# Patient Record
Sex: FEMALE | Race: WHITE | Employment: OTHER | URBAN - METROPOLITAN AREA
[De-identification: names, ages, dates, MRNs, and addresses within clinical notes are randomized per-mention and may not be internally consistent; named-entity substitution may affect disease eponyms.]

---

## 2017-07-07 ENCOUNTER — HOSPITAL ENCOUNTER (OUTPATIENT)
Dept: LAB | Age: 82
Discharge: HOME | End: 2017-07-07

## 2017-07-07 LAB
BASOPHILS # BLD AUTO: 0 K/UL (ref 0–0.2)
BASOPHILS # BLD: 0 % (ref 0–2)
DIFFERENTIAL METHOD BLD: ABNORMAL
EOSINOPHIL # BLD: 0 K/UL (ref 0–0.8)
EOSINOPHIL NFR BLD: 1 % (ref 0.5–7.8)
ERYTHROCYTE [DISTWIDTH] IN BLOOD BY AUTOMATED COUNT: 15.4 % (ref 11.9–14.6)
HCT VFR BLD AUTO: 31.2 % (ref 35.8–46.3)
HGB BLD-MCNC: 10.9 G/DL (ref 11.7–15.4)
IMM GRANULOCYTES # BLD: 0.1 K/UL (ref 0–0.5)
IMM GRANULOCYTES NFR BLD AUTO: 0.8 % (ref 0–5)
LYMPHOCYTES # BLD AUTO: 5 % (ref 13–44)
LYMPHOCYTES # BLD: 0.4 K/UL (ref 0.5–4.6)
MCH RBC QN AUTO: 35 PG (ref 26.1–32.9)
MCHC RBC AUTO-ENTMCNC: 34.9 G/DL (ref 31.4–35)
MCV RBC AUTO: 100.3 FL (ref 79.6–97.8)
MONOCYTES # BLD: 0.5 K/UL (ref 0.1–1.3)
MONOCYTES NFR BLD AUTO: 5 % (ref 4–12)
NEUTS SEG # BLD: 7.8 K/UL (ref 1.7–8.2)
NEUTS SEG NFR BLD AUTO: 88 % (ref 43–78)
PLATELET # BLD AUTO: 288 K/UL (ref 150–450)
PMV BLD AUTO: 13.5 FL (ref 10.8–14.1)
RBC # BLD AUTO: 3.11 M/UL (ref 4.05–5.25)
WBC # BLD AUTO: 8.8 K/UL (ref 4.3–11.1)

## 2017-07-07 PROCEDURE — 85025 COMPLETE CBC W/AUTO DIFF WBC: CPT | Performed by: FAMILY MEDICINE

## 2017-07-10 ENCOUNTER — APPOINTMENT (OUTPATIENT)
Dept: GENERAL RADIOLOGY | Age: 82
DRG: 189 | End: 2017-07-10
Attending: EMERGENCY MEDICINE
Payer: MEDICARE

## 2017-07-10 ENCOUNTER — HOSPITAL ENCOUNTER (INPATIENT)
Age: 82
LOS: 1 days | Discharge: NURSING, REHAB OR LONG-TERM CARE | DRG: 189 | End: 2017-07-12
Attending: EMERGENCY MEDICINE | Admitting: INTERNAL MEDICINE
Payer: MEDICARE

## 2017-07-10 ENCOUNTER — APPOINTMENT (OUTPATIENT)
Dept: CT IMAGING | Age: 82
DRG: 189 | End: 2017-07-10
Attending: EMERGENCY MEDICINE
Payer: MEDICARE

## 2017-07-10 DIAGNOSIS — R06.02 SOB (SHORTNESS OF BREATH): Primary | ICD-10-CM

## 2017-07-10 PROBLEM — R09.02 HYPOXIA: Status: ACTIVE | Noted: 2017-07-10

## 2017-07-10 LAB
ALBUMIN SERPL BCP-MCNC: 3 G/DL (ref 3.2–4.6)
ALBUMIN/GLOB SERPL: 0.6 {RATIO} (ref 1.2–3.5)
ALP SERPL-CCNC: 84 U/L (ref 50–136)
ALT SERPL-CCNC: 16 U/L (ref 12–65)
ANION GAP BLD CALC-SCNC: 7 MMOL/L (ref 7–16)
ARTERIAL PATENCY WRIST A: POSITIVE
AST SERPL W P-5'-P-CCNC: 12 U/L (ref 15–37)
ATRIAL RATE: 68 BPM
BASE EXCESS BLDA CALC-SCNC: 3.3 MMOL/L (ref 0–3)
BASOPHILS # BLD AUTO: 0 K/UL (ref 0–0.2)
BASOPHILS # BLD: 1 % (ref 0–2)
BDY SITE: ABNORMAL
BILIRUB SERPL-MCNC: 0.5 MG/DL (ref 0.2–1.1)
BNP SERPL-MCNC: 313 PG/ML
BUN SERPL-MCNC: 25 MG/DL (ref 8–23)
CALCIUM SERPL-MCNC: 9.4 MG/DL (ref 8.3–10.4)
CALCULATED P AXIS, ECG09: 68 DEGREES
CALCULATED R AXIS, ECG10: 28 DEGREES
CALCULATED T AXIS, ECG11: 28 DEGREES
CHLORIDE SERPL-SCNC: 103 MMOL/L (ref 98–107)
CO2 SERPL-SCNC: 30 MMOL/L (ref 21–32)
COHGB MFR BLD: 0.3 % (ref 0.5–1.5)
CREAT SERPL-MCNC: 0.79 MG/DL (ref 0.6–1)
DIAGNOSIS, 93000: NORMAL
DIFFERENTIAL METHOD BLD: ABNORMAL
DO-HGB BLD-MCNC: 11 % (ref 0–5)
EOSINOPHIL # BLD: 0.1 K/UL (ref 0–0.8)
EOSINOPHIL NFR BLD: 4 % (ref 0.5–7.8)
ERYTHROCYTE [DISTWIDTH] IN BLOOD BY AUTOMATED COUNT: 15.2 % (ref 11.9–14.6)
GLOBULIN SER CALC-MCNC: 4.8 G/DL (ref 2.3–3.5)
GLUCOSE SERPL-MCNC: 106 MG/DL (ref 65–100)
HCO3 BLDA-SCNC: 27 MMOL/L (ref 22–26)
HCT VFR BLD AUTO: 28.7 % (ref 35.8–46.3)
HGB BLD-MCNC: 9.5 G/DL (ref 11.7–15.4)
HGB BLDMV-MCNC: 9.4 GM/DL (ref 11.7–15)
IMM GRANULOCYTES # BLD: 0 K/UL (ref 0–0.5)
IMM GRANULOCYTES NFR BLD AUTO: 0.8 % (ref 0–5)
LYMPHOCYTES # BLD AUTO: 23 % (ref 13–44)
LYMPHOCYTES # BLD: 0.8 K/UL (ref 0.5–4.6)
MCH RBC QN AUTO: 34.5 PG (ref 26.1–32.9)
MCHC RBC AUTO-ENTMCNC: 33.1 G/DL (ref 31.4–35)
MCV RBC AUTO: 104.4 FL (ref 79.6–97.8)
METHGB MFR BLD: 0.3 % (ref 0–1.5)
MONOCYTES # BLD: 0.3 K/UL (ref 0.1–1.3)
MONOCYTES NFR BLD AUTO: 8 % (ref 4–12)
NEUTS SEG # BLD: 2.3 K/UL (ref 1.7–8.2)
NEUTS SEG NFR BLD AUTO: 63 % (ref 43–78)
OXYHGB MFR BLDA: 88.3 % (ref 94–97)
P-R INTERVAL, ECG05: 184 MS
PCO2 BLDA: 37 MMHG (ref 35–45)
PH BLDA: 7.48 [PH] (ref 7.35–7.45)
PLATELET # BLD AUTO: 281 K/UL (ref 150–450)
PMV BLD AUTO: 13 FL (ref 10.8–14.1)
PO2 BLDA: 53 MMHG (ref 75–100)
POTASSIUM SERPL-SCNC: 3.4 MMOL/L (ref 3.5–5.1)
PROT SERPL-MCNC: 7.8 G/DL (ref 6.3–8.2)
Q-T INTERVAL, ECG07: 420 MS
QRS DURATION, ECG06: 94 MS
QTC CALCULATION (BEZET), ECG08: 446 MS
RBC # BLD AUTO: 2.75 M/UL (ref 4.05–5.25)
SAO2 % BLD: 89 % (ref 92–98.5)
SERVICE CMNT-IMP: ABNORMAL
SODIUM SERPL-SCNC: 140 MMOL/L (ref 136–145)
TROPONIN I BLD-MCNC: 0.01 NG/ML (ref 0–0.08)
TROPONIN I SERPL-MCNC: <0.04 NG/ML (ref 0.02–0.05)
VENTILATION MODE VENT: ABNORMAL
VENTRICULAR RATE, ECG03: 68 BPM
WBC # BLD AUTO: 3.6 K/UL (ref 4.3–11.1)

## 2017-07-10 PROCEDURE — 99218 HC RM OBSERVATION: CPT

## 2017-07-10 PROCEDURE — 81003 URINALYSIS AUTO W/O SCOPE: CPT | Performed by: INTERNAL MEDICINE

## 2017-07-10 PROCEDURE — 36600 WITHDRAWAL OF ARTERIAL BLOOD: CPT

## 2017-07-10 PROCEDURE — 99285 EMERGENCY DEPT VISIT HI MDM: CPT | Performed by: EMERGENCY MEDICINE

## 2017-07-10 PROCEDURE — 74011250637 HC RX REV CODE- 250/637: Performed by: INTERNAL MEDICINE

## 2017-07-10 PROCEDURE — 74011250636 HC RX REV CODE- 250/636: Performed by: INTERNAL MEDICINE

## 2017-07-10 PROCEDURE — 85025 COMPLETE CBC W/AUTO DIFF WBC: CPT | Performed by: EMERGENCY MEDICINE

## 2017-07-10 PROCEDURE — 93005 ELECTROCARDIOGRAM TRACING: CPT | Performed by: EMERGENCY MEDICINE

## 2017-07-10 PROCEDURE — 80053 COMPREHEN METABOLIC PANEL: CPT | Performed by: EMERGENCY MEDICINE

## 2017-07-10 PROCEDURE — 71010 XR CHEST PORT: CPT

## 2017-07-10 PROCEDURE — 74011250636 HC RX REV CODE- 250/636: Performed by: EMERGENCY MEDICINE

## 2017-07-10 PROCEDURE — 70450 CT HEAD/BRAIN W/O DYE: CPT

## 2017-07-10 PROCEDURE — 96375 TX/PRO/DX INJ NEW DRUG ADDON: CPT | Performed by: EMERGENCY MEDICINE

## 2017-07-10 PROCEDURE — 96365 THER/PROPH/DIAG IV INF INIT: CPT | Performed by: EMERGENCY MEDICINE

## 2017-07-10 PROCEDURE — 94760 N-INVAS EAR/PLS OXIMETRY 1: CPT

## 2017-07-10 PROCEDURE — 77010033678 HC OXYGEN DAILY

## 2017-07-10 PROCEDURE — 74011000258 HC RX REV CODE- 258: Performed by: EMERGENCY MEDICINE

## 2017-07-10 PROCEDURE — 84484 ASSAY OF TROPONIN QUANT: CPT | Performed by: EMERGENCY MEDICINE

## 2017-07-10 PROCEDURE — 83880 ASSAY OF NATRIURETIC PEPTIDE: CPT | Performed by: INTERNAL MEDICINE

## 2017-07-10 PROCEDURE — 82803 BLOOD GASES ANY COMBINATION: CPT

## 2017-07-10 PROCEDURE — 87086 URINE CULTURE/COLONY COUNT: CPT | Performed by: INTERNAL MEDICINE

## 2017-07-10 RX ORDER — FLUTICASONE PROPIONATE 50 MCG
2 SPRAY, SUSPENSION (ML) NASAL
Status: DISCONTINUED | OUTPATIENT
Start: 2017-07-10 | End: 2017-07-12 | Stop reason: HOSPADM

## 2017-07-10 RX ORDER — LEVOFLOXACIN 5 MG/ML
500 INJECTION, SOLUTION INTRAVENOUS
Status: DISCONTINUED | OUTPATIENT
Start: 2017-07-10 | End: 2017-07-12

## 2017-07-10 RX ORDER — ACETAMINOPHEN 325 MG/1
650 TABLET ORAL
Status: DISCONTINUED | OUTPATIENT
Start: 2017-07-10 | End: 2017-07-12 | Stop reason: HOSPADM

## 2017-07-10 RX ORDER — LANOLIN ALCOHOL/MO/W.PET/CERES
1000 CREAM (GRAM) TOPICAL DAILY
Status: DISCONTINUED | OUTPATIENT
Start: 2017-07-11 | End: 2017-07-12 | Stop reason: HOSPADM

## 2017-07-10 RX ORDER — DIVALPROEX SODIUM 125 MG/1
125 CAPSULE, COATED PELLETS ORAL 2 TIMES DAILY
Status: DISCONTINUED | OUTPATIENT
Start: 2017-07-10 | End: 2017-07-12 | Stop reason: HOSPADM

## 2017-07-10 RX ORDER — FERROUS SULFATE, DRIED 160(50) MG
1 TABLET, EXTENDED RELEASE ORAL 2 TIMES DAILY WITH MEALS
Status: DISCONTINUED | OUTPATIENT
Start: 2017-07-10 | End: 2017-07-12 | Stop reason: HOSPADM

## 2017-07-10 RX ORDER — PRAVASTATIN SODIUM 20 MG/1
40 TABLET ORAL
Status: DISCONTINUED | OUTPATIENT
Start: 2017-07-10 | End: 2017-07-12 | Stop reason: HOSPADM

## 2017-07-10 RX ORDER — SOTALOL HYDROCHLORIDE 80 MG/1
80 TABLET ORAL DAILY
Status: DISCONTINUED | OUTPATIENT
Start: 2017-07-11 | End: 2017-07-10

## 2017-07-10 RX ORDER — SODIUM CHLORIDE 0.9 % (FLUSH) 0.9 %
5-10 SYRINGE (ML) INJECTION AS NEEDED
Status: DISCONTINUED | OUTPATIENT
Start: 2017-07-10 | End: 2017-07-12 | Stop reason: HOSPADM

## 2017-07-10 RX ORDER — LEVOFLOXACIN 5 MG/ML
500 INJECTION, SOLUTION INTRAVENOUS EVERY 24 HOURS
Status: DISCONTINUED | OUTPATIENT
Start: 2017-07-10 | End: 2017-07-10 | Stop reason: DRUGHIGH

## 2017-07-10 RX ORDER — OMEPRAZOLE 20 MG/1
20 CAPSULE, DELAYED RELEASE ORAL DAILY
COMMUNITY
End: 2017-07-12

## 2017-07-10 RX ORDER — SODIUM CHLORIDE 0.9 % (FLUSH) 0.9 %
5-10 SYRINGE (ML) INJECTION EVERY 8 HOURS
Status: DISCONTINUED | OUTPATIENT
Start: 2017-07-10 | End: 2017-07-12 | Stop reason: HOSPADM

## 2017-07-10 RX ORDER — CALCITONIN SALMON 200 [IU]/.09ML
1 SPRAY, METERED NASAL DAILY
Status: DISCONTINUED | OUTPATIENT
Start: 2017-07-11 | End: 2017-07-12 | Stop reason: HOSPADM

## 2017-07-10 RX ORDER — ONDANSETRON 2 MG/ML
4 INJECTION INTRAMUSCULAR; INTRAVENOUS
Status: DISCONTINUED | OUTPATIENT
Start: 2017-07-10 | End: 2017-07-12 | Stop reason: HOSPADM

## 2017-07-10 RX ORDER — SOTALOL HYDROCHLORIDE 80 MG/1
80 TABLET ORAL 2 TIMES DAILY
Status: DISCONTINUED | OUTPATIENT
Start: 2017-07-10 | End: 2017-07-12 | Stop reason: HOSPADM

## 2017-07-10 RX ORDER — FUROSEMIDE 10 MG/ML
40 INJECTION INTRAMUSCULAR; INTRAVENOUS DAILY
Status: DISCONTINUED | OUTPATIENT
Start: 2017-07-10 | End: 2017-07-12

## 2017-07-10 RX ORDER — MELATONIN
2000 DAILY
Status: DISCONTINUED | OUTPATIENT
Start: 2017-07-11 | End: 2017-07-12 | Stop reason: HOSPADM

## 2017-07-10 RX ORDER — HEPARIN SODIUM 5000 [USP'U]/ML
5000 INJECTION, SOLUTION INTRAVENOUS; SUBCUTANEOUS EVERY 8 HOURS
Status: DISCONTINUED | OUTPATIENT
Start: 2017-07-10 | End: 2017-07-12 | Stop reason: HOSPADM

## 2017-07-10 RX ORDER — ROPINIROLE 0.25 MG/1
0.25 TABLET, FILM COATED ORAL
Status: DISCONTINUED | OUTPATIENT
Start: 2017-07-10 | End: 2017-07-12 | Stop reason: HOSPADM

## 2017-07-10 RX ORDER — PANTOPRAZOLE SODIUM 40 MG/1
40 TABLET, DELAYED RELEASE ORAL DAILY
Status: DISCONTINUED | OUTPATIENT
Start: 2017-07-11 | End: 2017-07-12 | Stop reason: HOSPADM

## 2017-07-10 RX ORDER — LORAZEPAM 0.5 MG/1
0.25 TABLET ORAL
COMMUNITY
End: 2017-07-12

## 2017-07-10 RX ADMIN — Medication 5 ML: at 22:10

## 2017-07-10 RX ADMIN — HEPARIN SODIUM 5000 UNITS: 5000 INJECTION, SOLUTION INTRAVENOUS; SUBCUTANEOUS at 16:59

## 2017-07-10 RX ADMIN — FLUTICASONE PROPIONATE 2 SPRAY: 50 SPRAY, METERED NASAL at 22:10

## 2017-07-10 RX ADMIN — LEVOFLOXACIN 500 MG: 5 INJECTION, SOLUTION INTRAVENOUS at 17:00

## 2017-07-10 RX ADMIN — ROPINIROLE HYDROCHLORIDE 0.25 MG: 0.25 TABLET, FILM COATED ORAL at 22:04

## 2017-07-10 RX ADMIN — SOTALOL HYDROCHLORIDE 80 MG: 80 TABLET ORAL at 22:04

## 2017-07-10 RX ADMIN — PRAVASTATIN SODIUM 40 MG: 20 TABLET ORAL at 22:04

## 2017-07-10 RX ADMIN — CALCIUM CARBONATE 500 MG (1,250 MG)-VITAMIN D3 200 UNIT TABLET 1 TABLET: at 16:59

## 2017-07-10 RX ADMIN — CEFTRIAXONE SODIUM 1 G: 1 INJECTION, POWDER, FOR SOLUTION INTRAMUSCULAR; INTRAVENOUS at 12:16

## 2017-07-10 RX ADMIN — FUROSEMIDE 40 MG: 10 INJECTION, SOLUTION INTRAMUSCULAR; INTRAVENOUS at 14:44

## 2017-07-10 RX ADMIN — DIVALPROEX SODIUM 125 MG: 125 CAPSULE, COATED PELLETS ORAL at 17:00

## 2017-07-10 RX ADMIN — ACETAMINOPHEN 650 MG: 325 TABLET, FILM COATED ORAL at 22:04

## 2017-07-10 NOTE — H&P
History and Physical    Subjective:     Audrey Hudson is a 80 y. o. white female, with a pmh of atrial fibrillation and mild dementia, who presents from [address] [place] [address] today to the ED for acute hypoxia. Patient was found by staff to be slumped over with an increased work of breathing. ABG in ED shows pO2 of 88% on 2 liters nasal cannula- she does not wear oxygen at baseline. Was placed on po antibiotics by facility provider earlier this week for probable pneumonia. CXR obtained revealing possible RLL infiltrate (image is quite rotated) as well as small bilateral effusions. Echo obtained in December 2012 shows severe pulmonary HTN with peak pressures of 80-85. Given rocephin in ED. Patient confused in ED, history obtained from her granddaughter at bedside. CT head negative    DNR  HCPOA: granddaughterVianey. [phone]    Past Medical History:   Diagnosis Date    Atrial fibrillation ([address]     CAD (coronary artery disease)     Cancer (HCC)     breast cancer    Dementia     Gastrointestinal disease     chronic cholecystitis     Gastrointestinal disorder     GERD    Heart failure ([address]     Hypertension     Psychiatric disorder     depression    Stroke ([address]     left temporal and parietal CVA      Past Surgical History:   Procedure Laterality Date    BREAST SURGERY PROCEDURE UNLISTED      left mastectomy    VASCULAR SURGERY PROCEDURE UNLIST      coronary artery bypass graft     History reviewed. No pertinent family history. Social History   Substance Use Topics    Smoking status: Never Smoker    Smokeless tobacco: Never Used    Alcohol use Not on file       Prior to Admission medications    Medication Sig Start Date End Date Taking? Authorizing Provider   pravastatin (PRAVACHOL) 40 mg tablet Take 1 Tab by mouth nightly. 12/11/16   Hailee Knows, DO   calcitonin, salmon, (MIACALCIN) nasal 1 [place] by IntraNASal route daily.     Historical Provider   acetaminophen (MAPAP) 325 mg tablet Take 650 mg by mouth every four (4) hours as needed for Pain. Historical Provider   calcium-vitamin D (OYSCO 500/D) 500 mg(1,250mg) -200 unit per tablet Take 1 Tab by mouth two (2) times daily (with meals). Historical Provider   pantoprazole (PROTONIX) 40 mg tablet Take 40 mg by mouth daily. Historical Provider   VIT A/VIT C/VIT E/ZINC/COPPER (PRESERVISION AREDS PO) Take 1 Tab by mouth daily. Historical Provider   sotalol (BETAPACE) 80 mg tablet Take 80 mg by mouth two (2) times a day. Historical Provider   cyanocobalamin (VITAMIN B-12) 1,000 mcg tablet Take 1,000 mcg by mouth daily. Historical Provider   cholecalciferol, vitamin D3, (VITAMIN D3) 2,000 unit tab Take 2,000 Units by mouth daily. Historical Provider   fluticasone (FLONASE) 50 mcg/actuation nasal spray 2 Sprays by Both Nostrils route nightly. Historical Provider   rOPINIRole (REQUIP) 0.25 mg tablet Take 0.25 mg by mouth nightly. Historical Provider   promethazine (PHENERGAN) 25 mg tablet Take 25 mg by mouth every six (6) hours as needed for Nausea. Historical Provider   ramipril (ALTACE) 2.5 mg capsule Take 7.5 mg by mouth daily. Historical Provider     No Known Allergies     Review of Systems:  Review of systems not obtained due to patient factors. Objective:      Intake and Output:            Physical Exam:     General: awake, alert, cooperative, pleasantly confused but can answer a few questions appropriately  Eyes; non icteric, EOMI  Neck ;supple  CV: IRIR, normal rate  PULM: crackles in bases, no wheezing, non labored  Abd; soft, non tender, non distended, no rebound/guarding, active BS  Neuro: cranial nerves II-XII grossly intact, moves all extremities  Ext/skin: warm, dry, no obvious rashes nor lesions, palpable pedal pulses    Data Review:   Recent Results (from the past 24 hour(s))   EKG, 12 LEAD, INITIAL    Collection Time: 07/10/17 10:43 AM   Result Value Ref Range    Ventricular Rate 68 BPM    Atrial Rate 68 BPM    P-R Interval 184 ms    QRS Duration 94 ms    Q-T Interval 420 ms    QTC Calculation (Bezet) 446 ms    Calculated P Axis 68 degrees    Calculated R Axis 28 degrees    Calculated T Axis 28 degrees    Diagnosis       !! AGE AND GENDER SPECIFIC ECG ANALYSIS !! Sinus rhythm with marked sinus arrhythmia  Otherwise normal ECG  When compared with ECG of 08-DEC-2016 19:54,  NM interval has decreased  ST no longer depressed in Lateral leads  T wave inversion no longer evident in Anterior leads  Confirmed by [facility] MIJARES (), MINA SANTA ([id number]) on 7/10/2017 12:52:01 PM     CBC WITH AUTOMATED DIFF    Collection Time: 07/10/17 11:07 AM   Result Value Ref Range    WBC 3.6 (L) 4.3 - 11.1 K/uL    RBC 2.75 (L) 4.05 - 5.25 M/uL    HGB 9.5 (L) 11.7 - 15.4 g/dL    HCT 28.7 (L) 35.8 - 46.3 %    .4 (H) 79.6 - 97.8 FL    MCH 34.5 (H) 26.1 - 32.9 PG    MCHC 33.1 31.4 - 35.0 g/dL    RDW 15.2 (H) 11.9 - 14.6 %    PLATELET 124 837 - 250 K/uL    MPV 13.0 10.8 - 14.1 FL    DF AUTOMATED      NEUTROPHILS 63 43 - 78 %    LYMPHOCYTES 23 13 - 44 %    MONOCYTES 8 4.0 - 12.0 %    EOSINOPHILS 4 0.5 - 7.8 %    BASOPHILS 1 0.0 - 2.0 %    IMMATURE GRANULOCYTES 0.8 0.0 - 5.0 %    ABS. NEUTROPHILS 2.3 1.7 - 8.2 K/UL    ABS. LYMPHOCYTES 0.8 0.5 - 4.6 K/UL    ABS. MONOCYTES 0.3 0.1 - 1.3 K/UL    ABS. EOSINOPHILS 0.1 0.0 - 0.8 K/UL    ABS. BASOPHILS 0.0 0.0 - 0.2 K/UL    ABS. IMM.  GRANS. 0.0 0.0 - 0.5 K/UL   METABOLIC PANEL, COMPREHENSIVE    Collection Time: 07/10/17 11:07 AM   Result Value Ref Range    Sodium 140 136 - 145 mmol/L    Potassium 3.4 (L) 3.5 - 5.1 mmol/L    Chloride 103 98 - 107 mmol/L    CO2 30 21 - 32 mmol/L    Anion gap 7 7 - 16 mmol/L    Glucose 106 (H) 65 - 100 mg/dL    BUN 25 (H) 8 - 23 MG/DL    Creatinine 0.79 0.6 - 1.0 MG/DL    GFR est AA >60 >60 ml/min/1.73m2    GFR est non-AA >60 >60 ml/min/1.73m2    Calcium 9.4 8.3 - 10.4 MG/DL    Bilirubin, total 0.5 0.2 - 1.1 MG/DL    ALT (SGPT) 16 12 - 65 U/L    AST (SGOT) 12 (L) 15 - 37 U/L    Alk. phosphatase 84 50 - 136 U/L    Protein, total 7.8 6.3 - 8.2 g/dL    Albumin 3.0 (L) 3.2 - 4.6 g/dL    Globulin 4.8 (H) 2.3 - 3.5 g/dL    A-G Ratio 0.6 (L) 1.2 - 3.5     TROPONIN I    Collection Time: 07/10/17 11:07 AM   Result Value Ref Range    Troponin-I, Qt. <0.04 0.02 - 0.05 NG/ML   POC TROPONIN-I    Collection Time: 07/10/17 11:47 AM   Result Value Ref Range    Troponin-I (POC) 0.01 0.0 - 0.08 ng/ml   BLOOD GAS, ARTERIAL    Collection Time: 07/10/17 12:45 PM   Result Value Ref Range    pH 7.48 (H) 7.35 - 7.45      PCO2 37 35 - 45 mmHg    PO2 53 (L) 75.0 - 100.0 mmHg    BICARBONATE 27 (H) 22 - 26 mmol/L    BASE EXCESS 3.3 (H) 0 - 3 mmol/L    TOTAL HEMOGLOBIN 9.4 (L) 11.7 - 15.0 GM/DL    O2 SAT 89 (L) 92.0 - 98.5 %    ARTERIAL O2 HGB 88.3 (L) 94.0 - 97.0 %    CARBOXYHEMOGLOBIN 0.3 (L) 0.5 - 1.5 %    METHEMOGLOBIN 0.3 0.0 - 1.5 %    DEOXYHEMOGLOBIN 11 (H) 0.0 - 5.0 %    SITE RR     ALLENS TEST POSITIVE      MODE RA     Respiratory comment: dr. Chrystal Guillen at 7 10 2017 12 48 50 PM. Read back. Chest x-ray rotated film, small bilateral pleural effusions, small RLL infiltrate. Reviewed today by me. Assessment:     Active Problems:    Hypoxia (7/10/2017)        Plan:     Start levaquin IV. Start lasix 40 mg IV daily. Check daily weights. Ins and outs. Wean oxygen as tolerated. Check BNP  Add depakote BID.    DNR- granddaughter expressed how quality of life is the biggest concern to the family and to please ensure no anxiety nor suffering    Anticipated date of dc: 1-2 days    Signed By: Jerry Anderson MD     July 10, 2017

## 2017-07-10 NOTE — PROGRESS NOTES
Care Management Interventions  Mode of Transport at Discharge: Other (see comment)  Transition of Care Consult (CM Consult): Discharge Planning  Current Support Network: [address]  Discharge Location  Discharge Placement: Skilled nursing facility      Chart reviewed. MARCELLA spoke with Yamileth at [facility] and confirmed pt is from their skilled unit and is a bed hold. Pt does not need a PPD. At d/c pt will return to [facility] skilled unit. Clinical was sent to [facility] via [facility].

## 2017-07-10 NOTE — ED NOTES
Verbal report given to Masha Valverde RN for continuation of care. Opportunity for questions and clarifications given. Pt in no acute distress. VS stable. RN to change brief before transport.

## 2017-07-10 NOTE — ED TRIAGE NOTES
Pt arrives EMS from [facility]. EMS states staff found pt leaning over bedside table \"gasping\" for air. Pt chronically uses 2L O2 at home. Pt is also being treated for CHF and pneumonia (but chest xray was negative for pneumonia, pt was still prescribed abx x2-3 days ago). Pt was given 1 extra dose of lasix this morning and increased O2 to 3L. EMS states on their arrival, pt was found sitting upright, no c/o chest pain, no SOB, 98% on 3L.

## 2017-07-10 NOTE — PROGRESS NOTES
Problem: Nutrition Deficit  Goal: *Optimize nutritional status  Nutrition  Reason for assessment: RD discretion. Assessment:   Diet order(s): Cardiac  Food/Nutrition Patient History:  Pt presented with acute hypoxia from SNF. Past medical history notable for A-Fib, GERD and dementia. Pt confused; unable to obtain food / nutrition history. Noted patient was offered Boost at SNF. Anthropometrics:Height: 5' (152.4 cm),  Weight: 40.3 kg (88 lb 12.8 oz), Weight Source: Bed, Body mass index is 17.34 kg/(m^2). BMI class of underweight. Macronutrient needs:  EER:  7538-8351 kcal /day (30-35 kcal/kg BW)  EPR:  48-60 grams protein/day (1.2-1.5 grams/kg BW)  Intake/Comparative Standards: Insufficient data. Pt just admitted; no meals served yet. Nutrition Diagnosis: No nutrition diagnosis at this time. Intervention:  1. Meals and snacks: Continue current diet; Modify texture to cut up meats and soft textured for ease of eating. Pt may need assistance with meals r/t altered mental status. Pt was able to tell me beverage preferences; noted in food services. 2.  Nutrition Supplement Therapy:  Initiated Ensure Enlive all meal trays.    Trey Mckeon, [address] [place] [address], [facility] [place] [address], MPH  [phone]

## 2017-07-10 NOTE — ED PROVIDER NOTES
HPI Comments: Patient is a  68-year-old female who presents with shortness of breath. Per the facility that the patient came from they found her this morning gasping for air, confused, states that she had turned her oxygen up to 5 L from her baseline of 2 L. They state that the last time she was seen normal was yesterday evening and that she can typically speak clearly however does have dementia and have some confusion at baseline. On arrival patient appears confused, unable to adequately explain what occurred or if she is in pain. She does state that she was short of breath and felt like she was \"gasping for air\". Patient is a 80 y.o. female presenting with shortness of breath. The history is provided by the patient and a relative (patient's granddaughter and power of - Sylvester Cade). Shortness of Breath          Past Medical History:   Diagnosis Date    [facility] ([facility]     CAD (coronary artery disease)     Cancer (HCC)     breast cancer    Dementia     Gastrointestinal disease     chronic cholecystitis     Gastrointestinal disorder     GERD    Heart failure ([facility]     Hypertension     Psychiatric disorder     depression    Stroke ([facility]     left temporal and parietal CVA       Past Surgical History:   Procedure Laterality Date    BREAST SURGERY PROCEDURE UNLISTED      left mastectomy    VASCULAR SURGERY PROCEDURE UNLIST      coronary artery bypass graft         History reviewed. No pertinent family history. Social History     Social History    Marital status:      Spouse name: N/A    Number of children: N/A    Years of education: N/A     Occupational History    Not on file.      Social History Main Topics    Smoking status: Never Smoker    Smokeless tobacco: Never Used    Alcohol use Not on file    Drug use: Not on file    Sexual activity: Not on file     Other Topics Concern    Not on file     Social History Narrative    No narrative on file         ALLERGIES: Review of patient's allergies indicates no known allergies. Review of Systems   Unable to perform ROS: Dementia   Respiratory: Positive for shortness of breath. Psychiatric/Behavioral: Positive for confusion. Vitals:    07/10/17 1035 07/10/17 1108   BP: 167/79 131/60   Pulse: 76    Resp: 20    Temp: 98.8 °F (37.1 °C)    SpO2: 97% 96%   Weight: 40.8 kg (90 lb)    Height: 5' (1.524 m)             Physical Exam   Constitutional: She appears well-developed and well-nourished. HENT:   Head: Normocephalic. Right Ear: External ear normal.   Left Ear: External ear normal.   Eyes: Conjunctivae and EOM are normal. Pupils are equal, round, and reactive to light. Neck: Normal range of motion. Neck supple. No tracheal deviation present. Cardiovascular: Normal rate, regular rhythm, normal heart sounds and intact distal pulses. No murmur heard. Pulmonary/Chest: Effort normal. No respiratory distress. She has rales (right lung base). Abdominal: Soft. There is no tenderness. Musculoskeletal: Normal range of motion. Neurological: She is alert. No cranial nerve deficit. Cranial nerves appear to be grossly intact,  She can move all extremities equally, inability to follow commands appropriately for full neurologic examination otherwise however on trying to explain what occurred this morning seems to have aphasia with \"word salad\" stating such things as \"my baby was blue\" while pointing to her lips but becomes frustrated as she tries to explain exactly what happenned. Skin: No rash noted. Nursing note and vitals reviewed.        MDM  Number of Diagnoses or Management Options  SOB (shortness of breath): new and requires workup     Amount and/or Complexity of Data Reviewed  Clinical lab tests: ordered and reviewed  Tests in the radiology section of CPT®: ordered and reviewed  Tests in the medicine section of CPT®: ordered and reviewed  Review and summarize past medical records: yes  Discuss the patient with other providers: yes (hospitalist)    Risk of Complications, Morbidity, and/or Mortality  Presenting problems: high  Diagnostic procedures: high  Management options: high    Patient Progress  Patient progress: stable    ED Course       Procedures           Recent Results (from the past 12 hour(s))   EKG, 12 LEAD, INITIAL    Collection Time: 07/10/17 10:43 AM   Result Value Ref Range    Ventricular Rate 68 BPM    Atrial Rate 68 BPM    P-R Interval 184 ms    QRS Duration 94 ms    Q-T Interval 420 ms    QTC Calculation (Bezet) 446 ms    Calculated P Axis 68 degrees    Calculated R Axis 28 degrees    Calculated T Axis 28 degrees    Diagnosis       !! AGE AND GENDER SPECIFIC ECG ANALYSIS !! Sinus rhythm with marked sinus arrhythmia  Otherwise normal ECG  When compared with ECG of 08-DEC-2016 19:54,  OR interval has decreased  ST no longer depressed in Lateral leads  T wave inversion no longer evident in Anterior leads     CBC WITH AUTOMATED DIFF    Collection Time: 07/10/17 11:07 AM   Result Value Ref Range    WBC PENDING K/uL    RBC PENDING M/uL    HGB PENDING g/dL    HCT PENDING %    MCV PENDING FL    MCH PENDING PG    MCHC PENDING g/dL    RDW PENDING %    PLATELET PENDING K/uL    MPV PENDING FL    DF PENDING      Xr Chest Port    Result Date: 7/10/2017  Portable chest: History: Shortness of breath, assess for pneumonia. History of CHF Comparison: 12/08/2016 Findings: A single view of the chest was obtained at 10:53 AM. The patient is rotated towards the left. The cardiac silhouette is enlarged, unchanged. There is mild bibasilar atelectasis/infiltrate, left greater than right. Small left pleural effusion is also present. The pulmonary vasculature is felt to be within normal limits. Median sternotomy wires present. Methylmethacrylate is present within the mid to lower thoracic vertebral body. Surgical clips overlie the left axilla.      IMPRESSION:  Mild bibasilar atelectasis/infiltrate and small left pleural effusion. 81 yo female with pneumonia and confusion:    Will admit for pneumonia with worsened confusion and failed outpatient therapy. Likely all related to pneumonia however patients significant confusion which seems worse than baseline per discussion with [facility] staff so stroke considered as well. I spoke with patients daughter Sondra Duran who is POA and states she is DNR/DNI and states she did have recent stroke with some residual aphasia which may be what I am detecting on my exam.  Discussed with hospitalist for admission. Of note, Sondra Duran can be reached at [phone].

## 2017-07-10 NOTE — PROGRESS NOTES
Assessment complete via flow sheet. Pt pale, thin. Pt alert and oriented to person only. Respirations even, crackles in bases. S1 S2 auscultated. Pt  on 2L nasal canula. Pt reports pain level of 0 out of 10. Bowel sounds active, abdomen soft. Primary Nurse Brenda Albarran RN and Prema Bridges RN performed a dual skin assessment on this patient, skin thin fragile, small abrasion on right ear. Heavily wet brief changed, good kylee care given. Denies other needs. Bed in lowest position, side rails up 2, call bell in reach. Instructed to call for assistance. Pt verbalized understanding. Pt oriented to room, plan of care reviewed with patient.

## 2017-07-11 PROBLEM — J06.9 URI, ACUTE: Status: ACTIVE | Noted: 2017-07-11

## 2017-07-11 PROBLEM — E87.6 HYPOKALEMIA: Status: ACTIVE | Noted: 2017-07-11

## 2017-07-11 PROBLEM — J96.01 ACUTE RESPIRATORY FAILURE WITH HYPOXIA (HCC): Status: ACTIVE | Noted: 2017-07-11

## 2017-07-11 LAB
ANION GAP BLD CALC-SCNC: 7 MMOL/L (ref 7–16)
APPEARANCE UR: NORMAL
BASOPHILS # BLD AUTO: 0 K/UL (ref 0–0.2)
BASOPHILS # BLD: 0 % (ref 0–2)
BILIRUB UR QL: NEGATIVE
BUN SERPL-MCNC: 27 MG/DL (ref 8–23)
CALCIUM SERPL-MCNC: 9.2 MG/DL (ref 8.3–10.4)
CHLORIDE SERPL-SCNC: 103 MMOL/L (ref 98–107)
CO2 SERPL-SCNC: 32 MMOL/L (ref 21–32)
COLOR UR: YELLOW
CREAT SERPL-MCNC: 0.76 MG/DL (ref 0.6–1)
DIFFERENTIAL METHOD BLD: ABNORMAL
EOSINOPHIL # BLD: 0.1 K/UL (ref 0–0.8)
EOSINOPHIL NFR BLD: 5 % (ref 0.5–7.8)
ERYTHROCYTE [DISTWIDTH] IN BLOOD BY AUTOMATED COUNT: 14.9 % (ref 11.9–14.6)
GLUCOSE SERPL-MCNC: 101 MG/DL (ref 65–100)
GLUCOSE UR STRIP.AUTO-MCNC: NEGATIVE MG/DL
HCT VFR BLD AUTO: 25.5 % (ref 35.8–46.3)
HGB BLD-MCNC: 8.5 G/DL (ref 11.7–15.4)
HGB UR QL STRIP: NEGATIVE
IMM GRANULOCYTES # BLD: 0 K/UL (ref 0–0.5)
IMM GRANULOCYTES NFR BLD AUTO: 0.4 % (ref 0–5)
KETONES UR QL STRIP.AUTO: NEGATIVE MG/DL
LEUKOCYTE ESTERASE UR QL STRIP.AUTO: NEGATIVE
LYMPHOCYTES # BLD AUTO: 47 % (ref 13–44)
LYMPHOCYTES # BLD: 1.3 K/UL (ref 0.5–4.6)
MAGNESIUM SERPL-MCNC: 1.7 MG/DL (ref 1.8–2.4)
MCH RBC QN AUTO: 34.3 PG (ref 26.1–32.9)
MCHC RBC AUTO-ENTMCNC: 33.3 G/DL (ref 31.4–35)
MCV RBC AUTO: 102.8 FL (ref 79.6–97.8)
MM INDURATION POC: 0 MM (ref 0–5)
MONOCYTES # BLD: 0.2 K/UL (ref 0.1–1.3)
MONOCYTES NFR BLD AUTO: 8 % (ref 4–12)
NEUTS SEG # BLD: 1.1 K/UL (ref 1.7–8.2)
NEUTS SEG NFR BLD AUTO: 40 % (ref 43–78)
NITRITE UR QL STRIP.AUTO: NEGATIVE
PH UR STRIP: 7 [PH] (ref 5–9)
PHOSPHATE SERPL-MCNC: 3.4 MG/DL (ref 2.3–3.7)
PLATELET # BLD AUTO: 245 K/UL (ref 150–450)
PMV BLD AUTO: 12.7 FL (ref 10.8–14.1)
POTASSIUM SERPL-SCNC: 3.4 MMOL/L (ref 3.5–5.1)
PPD POC: 0 NEGATIVE
PROT UR STRIP-MCNC: NEGATIVE MG/DL
RBC # BLD AUTO: 2.48 M/UL (ref 4.05–5.25)
SODIUM SERPL-SCNC: 142 MMOL/L (ref 136–145)
SP GR UR REFRACTOMETRY: 1.01 (ref 1–1.02)
UROBILINOGEN UR QL STRIP.AUTO: 0.2 EU/DL (ref 0.2–1)
WBC # BLD AUTO: 2.8 K/UL (ref 4.3–11.1)

## 2017-07-11 PROCEDURE — 77030011943

## 2017-07-11 PROCEDURE — 83735 ASSAY OF MAGNESIUM: CPT | Performed by: INTERNAL MEDICINE

## 2017-07-11 PROCEDURE — 99218 HC RM OBSERVATION: CPT

## 2017-07-11 PROCEDURE — 36415 COLL VENOUS BLD VENIPUNCTURE: CPT | Performed by: INTERNAL MEDICINE

## 2017-07-11 PROCEDURE — 65270000029 HC RM PRIVATE

## 2017-07-11 PROCEDURE — 84100 ASSAY OF PHOSPHORUS: CPT | Performed by: INTERNAL MEDICINE

## 2017-07-11 PROCEDURE — 74011250636 HC RX REV CODE- 250/636: Performed by: INTERNAL MEDICINE

## 2017-07-11 PROCEDURE — 85025 COMPLETE CBC W/AUTO DIFF WBC: CPT | Performed by: INTERNAL MEDICINE

## 2017-07-11 PROCEDURE — 74011250637 HC RX REV CODE- 250/637: Performed by: INTERNAL MEDICINE

## 2017-07-11 PROCEDURE — 80048 BASIC METABOLIC PNL TOTAL CA: CPT | Performed by: INTERNAL MEDICINE

## 2017-07-11 RX ORDER — MAGNESIUM SULFATE HEPTAHYDRATE 40 MG/ML
2 INJECTION, SOLUTION INTRAVENOUS ONCE
Status: COMPLETED | OUTPATIENT
Start: 2017-07-11 | End: 2017-07-11

## 2017-07-11 RX ORDER — POTASSIUM CHLORIDE 20 MEQ/1
40 TABLET, EXTENDED RELEASE ORAL
Status: COMPLETED | OUTPATIENT
Start: 2017-07-11 | End: 2017-07-11

## 2017-07-11 RX ADMIN — MAGNESIUM SULFATE HEPTAHYDRATE 2 G: 40 INJECTION, SOLUTION INTRAVENOUS at 09:17

## 2017-07-11 RX ADMIN — DIVALPROEX SODIUM 125 MG: 125 CAPSULE, COATED PELLETS ORAL at 17:08

## 2017-07-11 RX ADMIN — VITAMIN D, TAB 1000IU (100/BT) 2000 UNITS: 25 TAB at 09:17

## 2017-07-11 RX ADMIN — HEPARIN SODIUM 5000 UNITS: 5000 INJECTION, SOLUTION INTRAVENOUS; SUBCUTANEOUS at 00:05

## 2017-07-11 RX ADMIN — Medication 10 ML: at 05:17

## 2017-07-11 RX ADMIN — FLUTICASONE PROPIONATE 2 SPRAY: 50 SPRAY, METERED NASAL at 21:54

## 2017-07-11 RX ADMIN — DIVALPROEX SODIUM 125 MG: 125 CAPSULE, COATED PELLETS ORAL at 09:17

## 2017-07-11 RX ADMIN — CALCIUM CARBONATE 500 MG (1,250 MG)-VITAMIN D3 200 UNIT TABLET 1 TABLET: at 17:09

## 2017-07-11 RX ADMIN — ROPINIROLE HYDROCHLORIDE 0.25 MG: 0.25 TABLET, FILM COATED ORAL at 21:52

## 2017-07-11 RX ADMIN — CYANOCOBALAMIN TAB 1000 MCG 1000 MCG: 1000 TAB at 09:17

## 2017-07-11 RX ADMIN — RAMIPRIL 7.5 MG: 5 CAPSULE ORAL at 09:17

## 2017-07-11 RX ADMIN — PANTOPRAZOLE SODIUM 40 MG: 40 TABLET, DELAYED RELEASE ORAL at 09:17

## 2017-07-11 RX ADMIN — FUROSEMIDE 40 MG: 10 INJECTION, SOLUTION INTRAMUSCULAR; INTRAVENOUS at 09:17

## 2017-07-11 RX ADMIN — HEPARIN SODIUM 5000 UNITS: 5000 INJECTION, SOLUTION INTRAVENOUS; SUBCUTANEOUS at 07:43

## 2017-07-11 RX ADMIN — SOTALOL HYDROCHLORIDE 80 MG: 80 TABLET ORAL at 07:43

## 2017-07-11 RX ADMIN — Medication 5 ML: at 13:50

## 2017-07-11 RX ADMIN — POTASSIUM CHLORIDE 40 MEQ: 20 TABLET, EXTENDED RELEASE ORAL at 09:17

## 2017-07-11 RX ADMIN — CALCIUM CARBONATE 500 MG (1,250 MG)-VITAMIN D3 200 UNIT TABLET 1 TABLET: at 07:43

## 2017-07-11 RX ADMIN — HEPARIN SODIUM 5000 UNITS: 5000 INJECTION, SOLUTION INTRAVENOUS; SUBCUTANEOUS at 17:10

## 2017-07-11 RX ADMIN — Medication 5 ML: at 21:52

## 2017-07-11 RX ADMIN — PRAVASTATIN SODIUM 40 MG: 20 TABLET ORAL at 21:51

## 2017-07-11 RX ADMIN — SOTALOL HYDROCHLORIDE 80 MG: 80 TABLET ORAL at 21:52

## 2017-07-11 NOTE — PROGRESS NOTES
Hospitalist Progress Note    Subjective:   Daily Progress Note: 7/11/2017 9:01 AM    Dea Little is a 80 y. o. white female, with a pmh of atrial fibrillation and mild dementia, who presented from [address] [place] [address] 7/10 to the ED for acute hypoxia. Patient was found by staff to be slumped over with an increased work of breathing. ABG in ED showed pO2 of 88% on 2 liters nasal cannula- she does not wear oxygen at baseline. Was placed on po antibiotics by facility provider earlier this week for probable pneumonia. CXR obtained revealing possible RLL infiltrate (image is quite rotated) as well as small bilateral effusions. Echo obtained in December 2012 shows severe pulmonary HTN with peak pressures of 80-85. Given rocephin in ED and was continued on IV levaquin. Also started on iv lasix due to bilateral pleural effusions. Appears improved this morning- in good spirits and without complaints. Is now in upper 90s on 2L NC. She denies chest pain, nausea, diarrhea. No family currently at bedside.      Current Facility-Administered Medications   Medication Dose Route Frequency    magnesium sulfate 2 g/50 ml IVPB (premix or compounded)  2 g IntraVENous ONCE    potassium chloride (K-DUR, KLOR-CON) SR tablet 40 mEq  40 mEq Oral NOW    calcitonin (salmon) (MIACALCIN) nasal 1 Spray  1 Spray IntraNASal DAILY    calcium-vitamin D (OS-GLENN) 500 mg-200 unit tablet  1 Tab Oral BID WITH MEALS    cholecalciferol (VITAMIN D3) tablet 2,000 Units  2,000 Units Oral DAILY    cyanocobalamin tablet 1,000 mcg  1,000 mcg Oral DAILY    fluticasone (FLONASE) 50 mcg/actuation nasal spray 2 Spray  2 Spray Both Nostrils QHS    pantoprazole (PROTONIX) tablet 40 mg  40 mg Oral DAILY    pravastatin (PRAVACHOL) tablet 40 mg  40 mg Oral QHS    ramipril (ALTACE) capsule 7.5 mg  7.5 mg Oral DAILY    rOPINIRole (REQUIP) tablet 0.25 mg  0.25 mg Oral QHS    sotalol (BETAPACE) tablet 80 mg  80 mg Oral BID    sodium chloride (NS) flush 5-10 mL  5-10 mL IntraVENous Q8H    sodium chloride (NS) flush 5-10 mL  5-10 mL IntraVENous PRN    acetaminophen (TYLENOL) tablet 650 mg  650 mg Oral Q4H PRN    ondansetron (ZOFRAN) injection 4 mg  4 mg IntraVENous Q4H PRN    heparin (porcine) injection 5,000 Units  5,000 Units SubCUTAneous Q8H    furosemide (LASIX) injection 40 mg  40 mg IntraVENous DAILY    divalproex (DEPAKOTE SPRINKLE) capsule 125 mg  125 mg Oral BID    levoFLOXacin (LEVAQUIN) 500 mg in D5W IVPB  500 mg IntraVENous Q48H    24 hour PPD read reminder  1 Each Other Rx Dosing/Monitoring    [START ON 2017] 48 hour PPD read reminder  1 Each Other Rx Dosing/Monitoring    [START ON 2017] 72 hour PPD read reminder  1 Each Other Rx Dosing/Monitoring        Review of Systems  A comprehensive review of systems was negative except for that written in the HPI.     Objective:     Visit Vitals    /70 (BP 1 Location: Left arm, BP Patient Position: At rest)    Pulse 68    Temp 96.9 °F (36.1 °C)    Resp 18    Ht 5' (1.524 m)    Wt 40.3 kg (88 lb 12.8 oz)    SpO2 100%    BMI 17.34 kg/m2    O2 Flow Rate (L/min): 2 l/min O2 Device: Nasal cannula    Temp (24hrs), Av.2 °F (36.8 °C), Min:96.9 °F (36.1 °C), Max:99.4 °F (37.4 °C)              General: awake, alert, cooperative, no acute distress, pleasantly confused but able to answer most of my questions  Eyes; non icteric, EOMI  Neck; supple  CV: IRIR  PULM: mild crackles in bases, non labored, no wheezing  Abd; soft, non tender, non distended, active BS    Additional comments:I reviewed the patient's new clinical lab test results. j    Data Review    Recent Results (from the past 24 hour(s))   EKG, 12 LEAD, INITIAL    Collection Time: 07/10/17 10:43 AM   Result Value Ref Range    Ventricular Rate 68 BPM    Atrial Rate 68 BPM    P-R Interval 184 ms    QRS Duration 94 ms    Q-T Interval 420 ms    QTC Calculation (Bezet) 446 ms    Calculated P Axis 68 degrees    Calculated R Axis 28 degrees Calculated T Axis 28 degrees    Diagnosis       !! AGE AND GENDER SPECIFIC ECG ANALYSIS !! Sinus rhythm with marked sinus arrhythmia  Otherwise normal ECG  When compared with ECG of 08-DEC-2016 19:54,  FL interval has decreased  ST no longer depressed in Lateral leads  T wave inversion no longer evident in Anterior leads  Confirmed by Jeff Hoang MD (), MINA SANTA ([id number]) on 7/10/2017 12:52:01 PM     CBC WITH AUTOMATED DIFF    Collection Time: 07/10/17 11:07 AM   Result Value Ref Range    WBC 3.6 (L) 4.3 - 11.1 K/uL    RBC 2.75 (L) 4.05 - 5.25 M/uL    HGB 9.5 (L) 11.7 - 15.4 g/dL    HCT 28.7 (L) 35.8 - 46.3 %    .4 (H) 79.6 - 97.8 FL    MCH 34.5 (H) 26.1 - 32.9 PG    MCHC 33.1 31.4 - 35.0 g/dL    RDW 15.2 (H) 11.9 - 14.6 %    PLATELET 187 929 - 492 K/uL    MPV 13.0 10.8 - 14.1 FL    DF AUTOMATED      NEUTROPHILS 63 43 - 78 %    LYMPHOCYTES 23 13 - 44 %    MONOCYTES 8 4.0 - 12.0 %    EOSINOPHILS 4 0.5 - 7.8 %    BASOPHILS 1 0.0 - 2.0 %    IMMATURE GRANULOCYTES 0.8 0.0 - 5.0 %    ABS. NEUTROPHILS 2.3 1.7 - 8.2 K/UL    ABS. LYMPHOCYTES 0.8 0.5 - 4.6 K/UL    ABS. MONOCYTES 0.3 0.1 - 1.3 K/UL    ABS. EOSINOPHILS 0.1 0.0 - 0.8 K/UL    ABS. BASOPHILS 0.0 0.0 - 0.2 K/UL    ABS. IMM. GRANS. 0.0 0.0 - 0.5 K/UL   METABOLIC PANEL, COMPREHENSIVE    Collection Time: 07/10/17 11:07 AM   Result Value Ref Range    Sodium 140 136 - 145 mmol/L    Potassium 3.4 (L) 3.5 - 5.1 mmol/L    Chloride 103 98 - 107 mmol/L    CO2 30 21 - 32 mmol/L    Anion gap 7 7 - 16 mmol/L    Glucose 106 (H) 65 - 100 mg/dL    BUN 25 (H) 8 - 23 MG/DL    Creatinine 0.79 0.6 - 1.0 MG/DL    GFR est AA >60 >60 ml/min/1.73m2    GFR est non-AA >60 >60 ml/min/1.73m2    Calcium 9.4 8.3 - 10.4 MG/DL    Bilirubin, total 0.5 0.2 - 1.1 MG/DL    ALT (SGPT) 16 12 - 65 U/L    AST (SGOT) 12 (L) 15 - 37 U/L    Alk.  phosphatase 84 50 - 136 U/L    Protein, total 7.8 6.3 - 8.2 g/dL    Albumin 3.0 (L) 3.2 - 4.6 g/dL    Globulin 4.8 (H) 2.3 - 3.5 g/dL    A-G Ratio 0.6 (L) 1.2 - 3.5     TROPONIN I    Collection Time: 07/10/17 11:07 AM   Result Value Ref Range    Troponin-I, Qt. <0.04 0.02 - 0.05 NG/ML   BNP    Collection Time: 07/10/17 11:07 AM   Result Value Ref Range     pg/mL   POC TROPONIN-I    Collection Time: 07/10/17 11:47 AM   Result Value Ref Range    Troponin-I (POC) 0.01 0.0 - 0.08 ng/ml   BLOOD GAS, ARTERIAL    Collection Time: 07/10/17 12:45 PM   Result Value Ref Range    pH 7.48 (H) 7.35 - 7.45      PCO2 37 35 - 45 mmHg    PO2 53 (L) 75.0 - 100.0 mmHg    BICARBONATE 27 (H) 22 - 26 mmol/L    BASE EXCESS 3.3 (H) 0 - 3 mmol/L    TOTAL HEMOGLOBIN 9.4 (L) 11.7 - 15.0 GM/DL    O2 SAT 89 (L) 92.0 - 98.5 %    ARTERIAL O2 HGB 88.3 (L) 94.0 - 97.0 %    CARBOXYHEMOGLOBIN 0.3 (L) 0.5 - 1.5 %    METHEMOGLOBIN 0.3 0.0 - 1.5 %    DEOXYHEMOGLOBIN 11 (H) 0.0 - 5.0 %    SITE RR     ALLENS TEST POSITIVE      MODE RA     Respiratory comment: dr. Marky Jung at 7 10 2017 12 48 50 PM. Read back. CBC WITH AUTOMATED DIFF    Collection Time: 07/11/17  6:37 AM   Result Value Ref Range    WBC 2.8 (L) 4.3 - 11.1 K/uL    RBC 2.48 (L) 4.05 - 5.25 M/uL    HGB 8.5 (L) 11.7 - 15.4 g/dL    HCT 25.5 (L) 35.8 - 46.3 %    .8 (H) 79.6 - 97.8 FL    MCH 34.3 (H) 26.1 - 32.9 PG    MCHC 33.3 31.4 - 35.0 g/dL    RDW 14.9 (H) 11.9 - 14.6 %    PLATELET 397 808 - 444 K/uL    MPV 12.7 10.8 - 14.1 FL    DF AUTOMATED      NEUTROPHILS 40 (L) 43 - 78 %    LYMPHOCYTES 47 (H) 13 - 44 %    MONOCYTES 8 4.0 - 12.0 %    EOSINOPHILS 5 0.5 - 7.8 %    BASOPHILS 0 0.0 - 2.0 %    IMMATURE GRANULOCYTES 0.4 0.0 - 5.0 %    ABS. NEUTROPHILS 1.1 (L) 1.7 - 8.2 K/UL    ABS. LYMPHOCYTES 1.3 0.5 - 4.6 K/UL    ABS. MONOCYTES 0.2 0.1 - 1.3 K/UL    ABS. EOSINOPHILS 0.1 0.0 - 0.8 K/UL    ABS. BASOPHILS 0.0 0.0 - 0.2 K/UL    ABS. IMM.  GRANS. 0.0 0.0 - 0.5 K/UL   METABOLIC PANEL, BASIC    Collection Time: 07/11/17  6:37 AM   Result Value Ref Range    Sodium 142 136 - 145 mmol/L    Potassium 3.4 (L) 3.5 - 5.1 mmol/L    Chloride 103 98 - 107 mmol/L    CO2 32 21 - 32 mmol/L    Anion gap 7 7 - 16 mmol/L    Glucose 101 (H) 65 - 100 mg/dL    BUN 27 (H) 8 - 23 MG/DL    Creatinine 0.76 0.6 - 1.0 MG/DL    GFR est AA >60 >60 ml/min/1.73m2    GFR est non-AA >60 >60 ml/min/1.73m2    Calcium 9.2 8.3 - 10.4 MG/DL   MAGNESIUM    Collection Time: 07/11/17  6:37 AM   Result Value Ref Range    Magnesium 1.7 (L) 1.8 - 2.4 mg/dL   PHOSPHORUS    Collection Time: 07/11/17  6:37 AM   Result Value Ref Range    Phosphorus 3.4 2.3 - 3.7 MG/DL         Assessment/Plan:     Principal Problem:    Acute respiratory failure with hypoxia (HCC) (7/11/2017)  -due to URI and volume overload from severe pulmonary HTN. Continue IV lasix 40 daily (change to PO tomorrow) and IV levaquin. Wean oxygen to keep sats >92% today. Daily weights. Ins and outs. Active Problems:    Pulmonary hypertension, moderate to severe ([address] (12/9/2016)      Overview: Severe 80-85mmHg      Hypoxia (7/10/2017)      Hypokalemia (7/11/2017)  -40 meq PO x one      URI, acute (7/11/2017)  -iv levaquin, day 2. Change to PO tomorrow. Due to acute respiratory failure requiring oxygen, patient meets inpatient criteria. ROS, social/family/medical history from 7/10 reviewed by me with no changes.      Care Plan discussed with: Patient/Family      Signed By: Otelia Sandhoff, MD     July 11, 2017

## 2017-07-11 NOTE — PROGRESS NOTES
Assessment complete via flow sheet. Pt alert, oriented to person. Respirations even and unlabored, crackles in bases, left side more so. S1 S2 auscultated. Pt on 2L nasal canula. Pt reports pain level of 0 out of 10. Bowel sounds active, abdomen soft. Pt swallows pills who;e with water. Denies other needs. Bed in lowest position, side rails up 3, call bell in reach. Instructed to call for assistance. Pt verbalized understanding. Plan of care reviewed with patient.

## 2017-07-11 NOTE — PROGRESS NOTES
PM assessment complete. Alert, oriented to person only. Respirations even and unlabored, no distress noted. Oxygen in place at 2 LPM.  Abdomen soft, bowel sounds active in all 4 quadrants. Denies needs or pain. Bed alarm on.

## 2017-07-11 NOTE — PROGRESS NOTES
Medicare Outpatient Observation Notice provided to the patient. Oral explanation was provided and all questions answered. Signed document placed in the medical record under media tab. Copy to patient. Pt unable to sign due to dementia. Family not at bedside.

## 2017-07-12 VITALS
HEART RATE: 66 BPM | RESPIRATION RATE: 18 BRPM | HEIGHT: 60 IN | BODY MASS INDEX: 17.61 KG/M2 | OXYGEN SATURATION: 94 % | WEIGHT: 89.7 LBS | DIASTOLIC BLOOD PRESSURE: 66 MMHG | SYSTOLIC BLOOD PRESSURE: 137 MMHG | TEMPERATURE: 96.5 F

## 2017-07-12 LAB
ANION GAP BLD CALC-SCNC: 6 MMOL/L (ref 7–16)
BASOPHILS # BLD AUTO: 0.1 K/UL (ref 0–0.2)
BASOPHILS NFR BLD MANUAL: 2 % (ref 0–2)
BUN SERPL-MCNC: 26 MG/DL (ref 8–23)
CALCIUM SERPL-MCNC: 9.5 MG/DL (ref 8.3–10.4)
CHLORIDE SERPL-SCNC: 100 MMOL/L (ref 98–107)
CO2 SERPL-SCNC: 33 MMOL/L (ref 21–32)
CREAT SERPL-MCNC: 0.67 MG/DL (ref 0.6–1)
DIFFERENTIAL METHOD BLD: ABNORMAL
EOSINOPHIL # BLD: 0.2 K/UL (ref 0–0.8)
EOSINOPHIL NFR BLD MANUAL: 6 % (ref 1–8)
ERYTHROCYTE [DISTWIDTH] IN BLOOD BY AUTOMATED COUNT: 14.9 % (ref 11.9–14.6)
GLUCOSE SERPL-MCNC: 107 MG/DL (ref 65–100)
HCT VFR BLD AUTO: 29.1 % (ref 35.8–46.3)
HGB BLD-MCNC: 9.6 G/DL (ref 11.7–15.4)
LYMPHOCYTES # BLD: 1 K/UL (ref 0.5–4.6)
LYMPHOCYTES NFR BLD MANUAL: 31 % (ref 16–44)
MAGNESIUM SERPL-MCNC: 2.1 MG/DL (ref 1.8–2.4)
MCH RBC QN AUTO: 34.8 PG (ref 26.1–32.9)
MCHC RBC AUTO-ENTMCNC: 33 G/DL (ref 31.4–35)
MCV RBC AUTO: 105.4 FL (ref 79.6–97.8)
MONOCYTES # BLD: 0.4 K/UL (ref 0.1–1.3)
MONOCYTES NFR BLD MANUAL: 12 % (ref 3–9)
NEUTS SEG # BLD: 1.4 K/UL (ref 1.7–8.2)
NEUTS SEG NFR BLD MANUAL: 49 % (ref 47–75)
PHOSPHATE SERPL-MCNC: 3 MG/DL (ref 2.3–3.7)
PLATELET # BLD AUTO: 246 K/UL (ref 150–450)
PLATELET COMMENTS,PCOM: ADEQUATE
PMV BLD AUTO: 13.1 FL (ref 10.8–14.1)
POTASSIUM SERPL-SCNC: 4.1 MMOL/L (ref 3.5–5.1)
RBC # BLD AUTO: 2.76 M/UL (ref 4.05–5.25)
RBC MORPH BLD: ABNORMAL
SODIUM SERPL-SCNC: 139 MMOL/L (ref 136–145)
WBC # BLD AUTO: 3.1 K/UL (ref 4.3–11.1)
WBC MORPH BLD: ABNORMAL

## 2017-07-12 PROCEDURE — 85025 COMPLETE CBC W/AUTO DIFF WBC: CPT | Performed by: INTERNAL MEDICINE

## 2017-07-12 PROCEDURE — 94760 N-INVAS EAR/PLS OXIMETRY 1: CPT

## 2017-07-12 PROCEDURE — 83735 ASSAY OF MAGNESIUM: CPT | Performed by: INTERNAL MEDICINE

## 2017-07-12 PROCEDURE — 80048 BASIC METABOLIC PNL TOTAL CA: CPT | Performed by: INTERNAL MEDICINE

## 2017-07-12 PROCEDURE — 74011250637 HC RX REV CODE- 250/637: Performed by: INTERNAL MEDICINE

## 2017-07-12 PROCEDURE — 84100 ASSAY OF PHOSPHORUS: CPT | Performed by: INTERNAL MEDICINE

## 2017-07-12 PROCEDURE — 36415 COLL VENOUS BLD VENIPUNCTURE: CPT | Performed by: INTERNAL MEDICINE

## 2017-07-12 PROCEDURE — 74011250636 HC RX REV CODE- 250/636: Performed by: INTERNAL MEDICINE

## 2017-07-12 RX ORDER — FUROSEMIDE 40 MG/1
40 TABLET ORAL DAILY
Status: DISCONTINUED | OUTPATIENT
Start: 2017-07-12 | End: 2017-07-12 | Stop reason: HOSPADM

## 2017-07-12 RX ORDER — DIVALPROEX SODIUM 125 MG/1
125 CAPSULE, COATED PELLETS ORAL 2 TIMES DAILY
Qty: 60 CAP | Refills: 0 | Status: SHIPPED | OUTPATIENT
Start: 2017-07-12

## 2017-07-12 RX ORDER — LEVOFLOXACIN 500 MG/1
500 TABLET, FILM COATED ORAL
Qty: 3 TAB | Refills: 0 | Status: SHIPPED | OUTPATIENT
Start: 2017-07-12 | End: 2017-07-18

## 2017-07-12 RX ORDER — LEVOFLOXACIN 250 MG/1
500 TABLET ORAL
Status: DISCONTINUED | OUTPATIENT
Start: 2017-07-12 | End: 2017-07-12 | Stop reason: HOSPADM

## 2017-07-12 RX ORDER — FUROSEMIDE 40 MG/1
40 TABLET ORAL DAILY
Qty: 30 TAB | Refills: 0 | Status: SHIPPED | OUTPATIENT
Start: 2017-07-12

## 2017-07-12 RX ORDER — DIVALPROEX SODIUM 125 MG/1
125 CAPSULE, COATED PELLETS ORAL 2 TIMES DAILY
Qty: 60 CAP | Refills: 0 | Status: SHIPPED
Start: 2017-07-12 | End: 2017-07-12

## 2017-07-12 RX ORDER — FUROSEMIDE 40 MG/1
40 TABLET ORAL DAILY
Qty: 30 TAB | Refills: 0 | Status: SHIPPED
Start: 2017-07-12 | End: 2017-07-12

## 2017-07-12 RX ADMIN — PANTOPRAZOLE SODIUM 40 MG: 40 TABLET, DELAYED RELEASE ORAL at 08:47

## 2017-07-12 RX ADMIN — HEPARIN SODIUM 5000 UNITS: 5000 INJECTION, SOLUTION INTRAVENOUS; SUBCUTANEOUS at 00:08

## 2017-07-12 RX ADMIN — DIVALPROEX SODIUM 125 MG: 125 CAPSULE, COATED PELLETS ORAL at 08:47

## 2017-07-12 RX ADMIN — CALCIUM CARBONATE 500 MG (1,250 MG)-VITAMIN D3 200 UNIT TABLET 1 TABLET: at 08:47

## 2017-07-12 RX ADMIN — CYANOCOBALAMIN TAB 1000 MCG 1000 MCG: 1000 TAB at 08:48

## 2017-07-12 RX ADMIN — FUROSEMIDE 40 MG: 40 TABLET ORAL at 09:57

## 2017-07-12 RX ADMIN — LEVOFLOXACIN 500 MG: 250 TABLET, FILM COATED ORAL at 09:57

## 2017-07-12 RX ADMIN — SOTALOL HYDROCHLORIDE 80 MG: 80 TABLET ORAL at 08:47

## 2017-07-12 RX ADMIN — HEPARIN SODIUM 5000 UNITS: 5000 INJECTION, SOLUTION INTRAVENOUS; SUBCUTANEOUS at 08:47

## 2017-07-12 RX ADMIN — RAMIPRIL 7.5 MG: 5 CAPSULE ORAL at 08:47

## 2017-07-12 RX ADMIN — VITAMIN D, TAB 1000IU (100/BT) 2000 UNITS: 25 TAB at 08:47

## 2017-07-12 NOTE — PROGRESS NOTES
07/12/17 0940   Oxygen Therapy   O2 Sat (%) 94 %   Pulse via Oximetry 68 beats per minute   O2 Device Room air

## 2017-07-12 NOTE — PROGRESS NOTES
Phone report given to Adelina Sigala LPN. Phone number provided to floor, opportunity provided for questions.

## 2017-07-12 NOTE — DISCHARGE SUMMARY
Physician Discharge Summary       Patient: Benigno Perez MRN: [id number]  SSN: [id number]    YOB: 1921  Age: 80 y.o. Sex: female    PCP: Fredrick Fernando MD    Admit date: 7/10/2017  Admitting Provider: Shasta Donnelly MD    Discharge date: 7/12/2017  Discharging Provider: Shasta Donnelly MD    * Admission Diagnoses: Hypoxia  Acute respiratory failure with hypoxia [facility])    * Discharge Diagnoses:    Hospital Problems as of 7/12/2017  Never Reviewed          Codes Class Noted - Resolved POA    * (Principal)Acute respiratory failure with hypoxia ([facility] [address] ICD-10-CM: J96.01  ICD-9-CM: 518.81  7/11/2017 - Present Yes        Hypokalemia ICD-10-CM: E87.6  ICD-9-CM: 276.8  7/11/2017 - Present Unknown        URI, acute ICD-10-CM: J06.9  ICD-9-CM: 465.9  7/11/2017 - Present Yes        Hypoxia ICD-10-CM: R09.02  ICD-9-CM: 799.02  7/10/2017 - Present Unknown        Pulmonary hypertension, moderate to severe (HCC) (Chronic) ICD-10-CM: I27.2  ICD-9-CM: 416.8  12/9/2016 - Present Yes    Overview Signed 12/9/2016  2:44 PM by Kendra Galeano MD     Severe 80-85mmHg                   * Hospital Course:     Branden Odom a 80 y. o. white female, with a pmh of atrial fibrillation and mild dementia, who presented from [address] [place] [address] 7/10 to the ED for acute hypoxia.  Patient was found by staff to be slumped over with an increased work of breathing.  ABG in ED showed pO2 of 88% on 2 liters nasal cannula- she does not wear oxygen at baseline.  Was placed on po antibiotics by facility provider earlier this week for probable pneumonia. CXR obtained revealing possible RLL infiltrate (image is quite rotated) as well as small bilateral effusions. Echo obtained in December 2012 shows severe pulmonary HTN with peak pressures of 80-85.  Given rocephin in ED and was continued on IV levaquin.  Also started on iv lasix due to bilateral pleural effusions. Is now 96% on room air and tolerating well.   She has been converted to oral lasix and to oral levaquin to complete her antibiotic course and is medically stable for discharge back to [facility] where she resides. Significant Diagnostic Studies:     CT head without contrast: 07/10/2017     History: Confusion beginning today.     Technique: 5mm axial images were obtained from the skull base to the vertex  without contrast. Radiation dose reduction techniques were used for this study:   Our CT scanners use one or all of the following: Automated exposure control,  adjustment of the mA and/or kVp according to patient's size, iterative  reconstruction.     Comparison: 12/08/2016     Findings: The ventricles and sulci are prominent compatible with volume loss. 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 There are no extra-axial fluid collections. No evidence of acute intracranial hemorrhage or mass effect is identified. Patchy areas of decreased attenuation are noted within the supratentorial white  matter. These are nonspecific findings but would be most compatible with  moderate chronic small vessel ischemic changes.     There is no evidence to suggest an acute major territorial infarct.     The bony calvarium is intact. The visualized mastoid air cells and paranasal  sinuses are well pneumatized and aerated.     IMPRESSION  Impression: Chronic changes without evidence of acute intracranial abnormality.     Discharge Exam:    General: awake, alert, disoriented, no acute distress  Eyes; non icteric  Neck; supple  CV: IRIR, normal rate  PULM: course breath sounds but no wheezing nor crackles  Abd; soft, non tender, non distended, active BS    * Discharge Condition: stable  * Disposition: SNF at [facility]    Discharge Medications:  Current Discharge Medication List      START taking these medications    Details   divalproex (DEPAKOTE SPRINKLE) 125 mg capsule Take 1 Cap by mouth two (2) times a day.  Qty: 60 Cap, Refills: 0      furosemide (LASIX) 40 mg tablet Take 1 Tab by mouth daily. Qty: 30 Tab, Refills: 0      levoFLOXacin (LEVAQUIN) 500 mg tablet Take 1 Tab by mouth every fourty-eight (48) hours for 6 days. Qty: 3 Tab, Refills: 0         CONTINUE these medications which have NOT CHANGED    Details   apixaban (ELIQUIS) 2.5 mg tablet Take 2.5 mg by mouth two (2) times a day. calcitonin, salmon, (MIACALCIN) nasal 1 [place] by IntraNASal route daily. calcium-vitamin D (OYSCO 500/D) 500 mg(1,250mg) -200 unit per tablet Take 1 Tab by mouth two (2) times daily (with meals). VIT A/VIT C/VIT E/ZINC/COPPER (PRESERVISION AREDS PO) Take 1 Tab by mouth daily. sotalol (BETAPACE) 80 mg tablet Take 80 mg by mouth two (2) times a day. cyanocobalamin (VITAMIN B-12) 1,000 mcg tablet Take 1,000 mcg by mouth daily. cholecalciferol, vitamin D3, (VITAMIN D3) 2,000 unit tab Take 2,000 Units by mouth daily. fluticasone (FLONASE) 50 mcg/actuation nasal spray 2 Sprays by Both Nostrils route nightly. rOPINIRole (REQUIP) 0.25 mg tablet Take 0.25 mg by mouth nightly. ramipril (ALTACE) 2.5 mg capsule Take 7.5 mg by mouth daily. pravastatin (PRAVACHOL) 40 mg tablet Take 1 Tab by mouth nightly. Qty: 30 Tab, Refills: 0      acetaminophen (MAPAP) 325 mg tablet Take 650 mg by mouth every four (4) hours as needed for Pain.      pantoprazole (PROTONIX) 40 mg tablet Take 40 mg by mouth daily. promethazine (PHENERGAN) 25 mg tablet Take 25 mg by mouth every six (6) hours as needed for Nausea. STOP taking these medications       omeprazole (PRILOSEC) 20 mg capsule Comments:   Reason for Stopping:         LORazepam (ATIVAN) 0.5 mg tablet Comments:   Reason for Stopping:               * Follow-up Care/Patient Instructions:   Activity: as tolerated with assistance  Diet: cardiac    Follow-up Information     Follow up With Details Comments Contact Info    [facility]   EVONNE/ [address] [facility]  [phone]      Phys Other, MD   Patient can only remember the practice name and not the physician          35 minutes spent in discharge planning and coordination of care.      Signed:  Troy Peguero MD  7/12/2017  9:42 AM

## 2017-07-12 NOTE — PROGRESS NOTES
Pt discharged at this time via EMS. Pt with multiple family at bedside with belongings at side. No noted distress.

## 2017-07-12 NOTE — PROGRESS NOTES
PM assessment complete. Patient alert, oriented to self only. Respirations even and unlabored, no distress noted. Abdomen soft, bowel sounds active in all 4 quadrants. Denies needs or pain. Bed alarm on.

## 2017-07-12 NOTE — PROGRESS NOTES
Bedside report received from [facility]. Pt in bed, lab at side. No noted distress.

## 2017-07-12 NOTE — PROGRESS NOTES
SW called and left vm  At 9:30 today for Yamileth at [facility]. PT can return today. SW waiting on confirmation and room # from [id number] and will set up dc. Dajuan Lindsay    After multiple call to [facility] SNF and YUDITH, SW confirmed pt is from [address] [place] [address] Room 414. Pt can return, facility was expecting her back. RN called Report. Family aware. [facility] transport set. Zaire Corley and Yamileth were all aware and approved of pt's return today to [facility]. No additional needs identified at this time.   Dajuan Lindsay

## 2017-07-13 LAB
BACTERIA SPEC CULT: NORMAL
HBV SURFACE AG SERPL QL IA: NEGATIVE
HCV AB S/CO SERPL IA: <0.1 S/CO RATIO (ref 0–0.9)
HCV AB SERPL QL IA: NORMAL
HIV1 P24 AG SERPL QL IA: NONREACTIVE
HIV1+2 AB SERPL QL IA: NONREACTIVE
SERVICE CMNT-IMP: NORMAL